# Patient Record
Sex: FEMALE | Race: ASIAN
[De-identification: names, ages, dates, MRNs, and addresses within clinical notes are randomized per-mention and may not be internally consistent; named-entity substitution may affect disease eponyms.]

---

## 2018-05-01 ENCOUNTER — HOSPITAL ENCOUNTER (OUTPATIENT)
Dept: HOSPITAL 45 - C.MAMM | Age: 38
Discharge: HOME | End: 2018-05-01
Attending: FAMILY MEDICINE
Payer: COMMERCIAL

## 2018-05-01 DIAGNOSIS — N60.01: ICD-10-CM

## 2018-05-01 DIAGNOSIS — N63.11: Primary | ICD-10-CM

## 2018-05-01 DIAGNOSIS — R92.0: ICD-10-CM

## 2018-05-01 NOTE — MAMMOGRAPHY REPORT
UNILATERAL RIGHT DIGITAL DIAGNOSTIC MAMMOGRAM TOMOSYNTHESIS WITH CAD AND TARGETED RIGHT ULTRASOUND: 5
/1/2018

CLINICAL HISTORY: 38-year-old woman presents for follow-up in the right breast for segmental microcal
cifications in the medial, approximate 3:00 right breast.  The patient also reports a palpable lump i
n the 12:00 right breast may be new or increasing in size.  





TECHNIQUE: Right breast tomosynthesis in addition to standard 2D mammography was performed.  Spot mag
nification right CC and ML views were also obtained.  Current study was also evaluated with a Compute
r Aided Detection (CAD) system.  



COMPARISON: Comparison is made to exam dated:  10/31/2017 mammogram - [facility].  
 



BREAST COMPOSITION:  The tissue of the right breast is heterogeneously dense, which may obscure small
 masses.  



FINDINGS: A triangular palpable marker overlies the 12:00 anterior right breast, denoting the newly p
alpable versus increasing lump pointed out by the patient.  In the area of palpable concern, there is
 an oval circumscribed 5.5 x 5.4 x 3.3 cm mass.  No associated calcification or architectural distort
ion.  A second circumscribed oval mass is partially visualized in the 2:00 to 3:00 posterior right br
east, measuring approximately 3.3 x 2.7 x 2.2 cm. There is no focal area of architectural distortion 
or new suspicious spiculated or irregular mass.  The spot magnification views of the right breast red
emonstrate loosely grouped round and punctate segmental microcalcifications in the medial approximate
 2:00 to 3:00 right breast.  These do not appear significantly increased in number comparing to the p
rior spot magnification views and most likely represent benign fibrocystic change.  Another six-month
 follow-up diagnostic mammogram including spot magnification views is recommended to ensure longer st
ability.



Targeted ultrasound was performed in the area of palpable lump pointed out by the patient, in the 11:
00 right breast approximately 3 cm from the nipple.  In the area of palpable concern, there is an ova
l parallel circumscribed anechoic cyst with posterior acoustic enhancement, measuring at least 3.8 x 
1.6 x 5.0 cm.  In the adjacent 2:00 right breast, 3 cm from the nipple, a second oval parallel circum
scribed anechoic cyst with posterior acoustic enhancement is again seen, measuring 2.6 x 1.2 x 3.1 cm
, which is minimally increased comparing to the prior ultrasound.  



IMPRESSION:  ACR-BI-RADS CATEGORY 3: PROBABLY BENIGN, TARGETED ULTRASOUND ACR-BI-RADS CATEGORY 3: PRO
BABLY BENIGN 

1.  The palpable lump in the 11:00 to 12:00 right breast correlates with a dominant simple cyst on ul
trasound.  A second anechoic simple cyst is again seen in the 2:00 right breast, compatible with sonya
gn fibrocystic changes.

2.  There are stable segmental round and punctate microcalcifications in the 2:00 to 3:00 middle one 
third of the right breast, that appears similar to prior spot magnification views obtained on 10/31/2
017.  Although these calcifications most likely represent benign fibrocystic change, another six-allen
h follow-up diagnostic mammogram including spot magnification views is recommended to ensure longer s
tability.

3.  Annual left mammography could also be performed at that time.



These results and recommendations were discussed with the patient at the time of the exam.



Approximately 10% of breast cancers are not detected with mammography. A negative mammographic report
 should not delay biopsy if a clinically suggestive mass is present.



Grace Smart M.D.          

ay/:5/1/2018 09:32:33  



Imaging Technologist: Mariposa ALEXIS)(NIVIA), [facility]

letter sent: Follow Up Recommended 3  

BI-RADS Code: ACR-BI-RADS Category 3: Probably Benign  Ultrasound BI-RADS: ACR-BI-RADS Category 3: Pr
obably Benign